# Patient Record
Sex: MALE | Race: OTHER | HISPANIC OR LATINO | URBAN - METROPOLITAN AREA
[De-identification: names, ages, dates, MRNs, and addresses within clinical notes are randomized per-mention and may not be internally consistent; named-entity substitution may affect disease eponyms.]

---

## 2021-09-07 ENCOUNTER — EMERGENCY (EMERGENCY)
Facility: HOSPITAL | Age: 21
LOS: 1 days | Discharge: HOME | End: 2021-09-07
Attending: STUDENT IN AN ORGANIZED HEALTH CARE EDUCATION/TRAINING PROGRAM
Payer: COMMERCIAL

## 2021-09-07 VITALS
HEIGHT: 71 IN | WEIGHT: 231.49 LBS | RESPIRATION RATE: 17 BRPM | SYSTOLIC BLOOD PRESSURE: 132 MMHG | OXYGEN SATURATION: 98 % | HEART RATE: 80 BPM | DIASTOLIC BLOOD PRESSURE: 84 MMHG | TEMPERATURE: 98 F

## 2021-09-07 PROCEDURE — 99283 EMERGENCY DEPT VISIT LOW MDM: CPT | Mod: 25

## 2021-09-07 PROCEDURE — 12001 RPR S/N/AX/GEN/TRNK 2.5CM/<: CPT

## 2021-09-07 PROCEDURE — 99282 EMERGENCY DEPT VISIT SF MDM: CPT | Mod: 25

## 2021-09-07 NOTE — ED PROVIDER NOTE - NSFOLLOWUPINSTRUCTIONS_ED_ALL_ED_FT
Keep the dressing on the laceration site for 24 hours. Then you can remove the dressing. Wash area with soap and water and pad dry. Apply over the counter antibiotic cream (example: Bacitracin, Polysporin) to the area twice a day. Keep area uncovered and open to air. Cover it only if doing work that can get your wound dirty.  Suture removal in 7 days.  If you notice any increasing swelling, redness, increasing pain, fever, chills or foul drainage from the wound come back to the emergency room for re-evaluation.

## 2021-09-07 NOTE — ED PROVIDER NOTE - CLINICAL SUMMARY MEDICAL DECISION MAKING FREE TEXT BOX
21 year-old with superficial laceration/abrasion of the L posterior thigh and anterior shin abrasion. No signs of tendon injury. Plan: lac repair and dc home.

## 2021-09-07 NOTE — ED PROVIDER NOTE - CARE PLAN
1 Principal Discharge DX:	Laceration of skin of lower leg without complication, initial encounter   Principal Discharge DX:	Laceration of skin of lower leg without complication, initial encounter  Secondary Diagnosis:	Abrasion of lower leg, right, initial encounter

## 2021-09-07 NOTE — ED PROVIDER NOTE - OBJECTIVE STATEMENT
21 year-old male, no significant PMHx, presents with cc L leg laceration. While at work was falling down and grabbed onto the forklift to avoid the fall and hit his L leg on the metal blade. No head injury or LOC. Now c/o localized pain to the L posterior thigh and scratch to L shin area. No radiation of pain down the leg. Able to ambulate. No other complaints. Last tetanus immunization ~ 1 year ago.

## 2021-09-07 NOTE — ED ADULT TRIAGE NOTE - CHIEF COMPLAINT QUOTE
Health Maintenance Due   Topic Date Due   • Hepatitis B Vaccine (2 of 3 - 3-dose primary series) 2021       Patient is due for the topics as listed above and wishes to proceed with them.   Vaccine Information Statement(s) was given today. This has been reviewed, questions answered, and verbal consent given by Parent for injection(s) and administration of Multi Vaccines between birth and 6 months.      Patient tolerated without incident. See immunization grid for documentation.           Lac to L posterior thigh and l shin area from blade at work

## 2021-09-07 NOTE — ED PROVIDER NOTE - PROGRESS NOTE DETAILS
Lac repaired. Dc home with suture removal in1  week. Pt is well appearing walking with steady gait, stable for discharge and follow up without fail with medical doctor. I had a detailed discussion with the patient and/or guardian regarding the historical points, exam findings, and any diagnostic results supporting the discharge diagnosis. Pt educated on care and need for follow up. Strict return instructions and red flag signs and symptoms discussed with patient. Questions answered. Pt shows understanding of discharge information and agrees to follow.

## 2021-09-07 NOTE — ED PROVIDER NOTE - PHYSICAL EXAMINATION
LLE exam: Anterior shin with small superficial abrasion.  Distal posterior thigh with 6 cm laceration/abrasion (2.5 cm of deep laceration through dermis only, no SQ/tendon tissue exposure). Able to flex knee against resistance. Calf compartments soft and NT. Achilles tendon grossly intact.

## 2021-09-07 NOTE — ED PROVIDER NOTE - PATIENT PORTAL LINK FT
You can access the FollowMyHealth Patient Portal offered by [facility] by registering at the following website: [email or web address]. By joining [employer]’s FollowMyHealth portal, you will also be able to view your health information using other applications (apps) compatible with our system.

## 2021-09-14 ENCOUNTER — EMERGENCY (EMERGENCY)
Facility: HOSPITAL | Age: 21
LOS: 1 days | Discharge: HOME | End: 2021-09-14
Attending: EMERGENCY MEDICINE
Payer: COMMERCIAL

## 2021-09-14 VITALS
SYSTOLIC BLOOD PRESSURE: 140 MMHG | DIASTOLIC BLOOD PRESSURE: 86 MMHG | OXYGEN SATURATION: 98 % | HEIGHT: 71 IN | HEART RATE: 76 BPM | TEMPERATURE: 98 F | RESPIRATION RATE: 18 BRPM | WEIGHT: 229.94 LBS

## 2021-09-14 PROCEDURE — L9995: CPT

## 2021-09-14 PROCEDURE — G0463: CPT

## 2021-09-14 NOTE — ED PROVIDER NOTE - PHYSICAL EXAMINATION
GEN:   comfortable, in no apparent distress, AOx3  RESP:  non-labored, speaking in full sentences  MSK:   no musculoskeletal tenderness, 5/5 strength, moving all extremities  SKIN:   dry, no rash.  healing laceration posterior left leg.  no bleeding from site, discharge from site, increased pain, redness, streaking from site or any other signs or symptoms of infection.  No dehiscence noted from wound.   NEURO:   AOx3, no focal weakness or loss of sensation, gait normal, GCS 15  PSYCH: calm, cooperative, no apparent risk to self and others

## 2021-09-14 NOTE — ED PROVIDER NOTE - PATIENT PORTAL LINK FT
You can access the FollowMyHealth Patient Portal offered by [facility] by registering at the following website: [email or web address]. By joining [employer]’s FollowMyHealth portal, you will also be able to view your health information using other applications (apps) compatible with our system.

## 2021-09-14 NOTE — ED PROVIDER NOTE - ATTENDING CONTRIBUTION TO CARE
seen with acp  agree with acps assessment hx and physical and disposition  here for suture removal-to posterior left leg  sutures removed

## 2021-09-14 NOTE — ED PROVIDER NOTE - OBJECTIVE STATEMENT
21 year old male for suture removal posterior left leg.  4 sutures placed 7 days ago.  no fevers, chills, bleeding from site, discharge from site, increased pain, redness, streaking from site or any other signs or symptoms of infection.  No dehiscence noted from wound.

## 2022-05-17 ENCOUNTER — OUTPATIENT (OUTPATIENT)
Dept: OUTPATIENT SERVICES | Facility: HOSPITAL | Age: 22
LOS: 1 days | End: 2022-05-17
Payer: MEDICAID

## 2022-05-17 DIAGNOSIS — Z11.52 ENCOUNTER FOR SCREENING FOR COVID-19: ICD-10-CM

## 2022-05-17 PROBLEM — Z78.9 OTHER SPECIFIED HEALTH STATUS: Chronic | Status: ACTIVE | Noted: 2021-09-14

## 2022-05-17 LAB — SARS-COV-2 RNA SPEC QL NAA+PROBE: SIGNIFICANT CHANGE UP

## 2022-05-17 PROCEDURE — 87635 SARS-COV-2 COVID-19 AMP PRB: CPT
